# Patient Record
Sex: MALE | Race: ASIAN | URBAN - METROPOLITAN AREA
[De-identification: names, ages, dates, MRNs, and addresses within clinical notes are randomized per-mention and may not be internally consistent; named-entity substitution may affect disease eponyms.]

---

## 2023-02-02 ENCOUNTER — OFFICE VISIT (OUTPATIENT)
Dept: URBAN - METROPOLITAN AREA CLINIC 51 | Facility: CLINIC | Age: 63
End: 2023-02-02
Payer: COMMERCIAL

## 2023-02-02 DIAGNOSIS — H02.831 DERMATOCHALASIS OF RIGHT UPPER EYELID: ICD-10-CM

## 2023-02-02 DIAGNOSIS — H25.813 COMBINED FORMS OF AGE-RELATED CATARACT, BILATERAL: Primary | ICD-10-CM

## 2023-02-02 DIAGNOSIS — H02.834 DERMATOCHALASIS OF LEFT UPPER EYELID: ICD-10-CM

## 2023-02-02 DIAGNOSIS — H43.813 VITREOUS DEGENERATION, BILATERAL: ICD-10-CM

## 2023-02-02 PROCEDURE — 92134 CPTRZ OPH DX IMG PST SGM RTA: CPT | Performed by: OPTOMETRIST

## 2023-02-02 PROCEDURE — 99204 OFFICE O/P NEW MOD 45 MIN: CPT | Performed by: OPTOMETRIST

## 2023-02-02 ASSESSMENT — KERATOMETRY
OS: 43.38
OD: 44.38

## 2023-02-02 ASSESSMENT — VISUAL ACUITY
OS: 20/25
OD: 20/30

## 2023-02-02 ASSESSMENT — INTRAOCULAR PRESSURE
OS: 12
OD: 11

## 2023-02-02 NOTE — IMPRESSION/PLAN
Impression: Combined forms of age-related cataract, bilateral: H25.813. Cataract causing symptomatic impairment of visual function not correctable with a tolerable change in glasses or contact lenses resulting in the patient's inability to function satisfactorily while performing Activities of Daily Life including, but not limited to reading, viewing television, driving, or meeting vocational or recreational needs. Plan:  Discussed cataracts with patient. Discussed treatment options. Surgical treatment is recommended. Surgical risks and benefits discussed. Patient elects surgical treatment. Recommend surgery OU, OD first. Aim OD: plano. Aim OS: plano. 
Pt is interested in specialty IOLs OU

## 2023-04-03 ENCOUNTER — ADULT PHYSICAL (OUTPATIENT)
Dept: URBAN - METROPOLITAN AREA CLINIC 44 | Facility: CLINIC | Age: 63
End: 2023-04-03
Payer: COMMERCIAL

## 2023-04-03 DIAGNOSIS — H25.813 COMBINED FORMS OF AGE-RELATED CATARACT, BILATERAL: Primary | ICD-10-CM

## 2023-04-03 DIAGNOSIS — Z01.818 ENCOUNTER FOR OTHER PREPROCEDURAL EXAMINATION: Primary | ICD-10-CM

## 2023-04-03 PROCEDURE — 92025 CPTRIZED CORNEAL TOPOGRAPHY: CPT | Performed by: OPHTHALMOLOGY

## 2023-04-03 PROCEDURE — 99203 OFFICE O/P NEW LOW 30 MIN: CPT | Performed by: PHYSICIAN ASSISTANT

## 2023-04-03 ASSESSMENT — PACHYMETRY
OD: 24.70
OS: 24.47
OS: 3.57
OD: 3.55

## 2023-04-10 ENCOUNTER — OFFICE VISIT (OUTPATIENT)
Dept: URBAN - METROPOLITAN AREA CLINIC 44 | Facility: CLINIC | Age: 63
End: 2023-04-10
Payer: COMMERCIAL

## 2023-04-10 DIAGNOSIS — H25.813 COMBINED FORMS OF AGE-RELATED CATARACT, BILATERAL: Primary | ICD-10-CM

## 2023-04-10 DIAGNOSIS — H04.123 TEAR FILM INSUFFICIENCY OF BILATERAL LACRIMAL GLANDS: ICD-10-CM

## 2023-04-10 DIAGNOSIS — H52.223 REGULAR ASTIGMATISM, BILATERAL: ICD-10-CM

## 2023-04-10 DIAGNOSIS — H43.813 VITREOUS DEGENERATION, BILATERAL: ICD-10-CM

## 2023-04-10 PROCEDURE — 99204 OFFICE O/P NEW MOD 45 MIN: CPT | Performed by: OPHTHALMOLOGY

## 2023-04-10 ASSESSMENT — INTRAOCULAR PRESSURE
OD: 11
OS: 11

## 2023-04-10 NOTE — IMPRESSION/PLAN
Impression: Combined forms of age-related cataract, bilateral: H25.813. Plan: Discussed cataract diagnosis with the patient. Cataracts are limiting vision. Discussed risks, benefits and alternatives to surgery including but not limited to: bleeding, infection, risk of vision loss, loss of the eye, need for other surgery. Patient voiced understanding and wishes to proceed. Patient elects surgical treatment. Specialty lens options discussed and pt declines. Patient desires surgery OU,OD  first, Standard IOL  (( AIM :-0.25 OU, INJECTABLE OU (DEXTENZA FIRST CHOICE ,TRIMOXI SECOND CHOICE), YES ORA, YES  AMP )) Patient understands the need for glasses after surgery for BCVA.

## 2023-04-21 ENCOUNTER — POST-OPERATIVE VISIT (OUTPATIENT)
Dept: URBAN - METROPOLITAN AREA CLINIC 43 | Facility: CLINIC | Age: 63
End: 2023-04-21

## 2023-04-21 DIAGNOSIS — Z48.810 ENCOUNTER FOR SURGICAL AFTERCARE FOLLOWING SURGERY ON A SENSE ORGAN: Primary | ICD-10-CM

## 2023-04-21 PROCEDURE — 99024 POSTOP FOLLOW-UP VISIT: CPT | Performed by: OPTOMETRIST

## 2023-04-21 ASSESSMENT — INTRAOCULAR PRESSURE: OD: 17

## 2023-04-21 NOTE — IMPRESSION/PLAN
Impression: S/P Cataract Extraction by phacoemulsification with IOL placement; ORA OD - 1 Day. Encounter for surgical aftercare following surgery on a sense organ  Z48.810.  Plan: Pt doing well, return as scheduled for next post op

## 2023-04-27 ENCOUNTER — POST-OPERATIVE VISIT (OUTPATIENT)
Dept: URBAN - METROPOLITAN AREA CLINIC 51 | Facility: CLINIC | Age: 63
End: 2023-04-27
Payer: COMMERCIAL

## 2023-04-27 DIAGNOSIS — Z48.810 ENCOUNTER FOR SURGICAL AFTERCARE FOLLOWING SURGERY ON A SENSE ORGAN: Primary | ICD-10-CM

## 2023-04-27 PROCEDURE — 99024 POSTOP FOLLOW-UP VISIT: CPT | Performed by: OPTOMETRIST

## 2023-04-27 ASSESSMENT — VISUAL ACUITY
OD: 20/20
OS: 20/25

## 2023-04-27 ASSESSMENT — INTRAOCULAR PRESSURE
OD: 16
OS: 17

## 2023-04-27 NOTE — IMPRESSION/PLAN
Impression: S/P Cataract Extraction by phacoemulsification with IOL placement; ORA OD - 7 Days. Encounter for surgical aftercare following surgery on a sense organ  Z48.810. Plan: Pt is healing well with first eye cataract surgery and is ready to proceed with cataract surgery. Pt to continue with artificial tears to minimize irritation and fluctuating vision that can occur after cataract surgery.

## 2023-05-05 ENCOUNTER — POST-OPERATIVE VISIT (OUTPATIENT)
Dept: URBAN - METROPOLITAN AREA CLINIC 51 | Facility: CLINIC | Age: 63
End: 2023-05-05
Payer: COMMERCIAL

## 2023-05-05 DIAGNOSIS — Z96.1 PRESENCE OF INTRAOCULAR LENS: Primary | ICD-10-CM

## 2023-05-05 PROCEDURE — 99024 POSTOP FOLLOW-UP VISIT: CPT | Performed by: OPTOMETRIST

## 2023-05-05 ASSESSMENT — INTRAOCULAR PRESSURE: OS: 19

## 2023-05-05 NOTE — IMPRESSION/PLAN
Impression: S/P Cataract Extraction by phacoemulsification with IOL placement; ORA OS - 1 Day. Presence of intraocular lens  Z96.1. Plan: Doing well POD1. Swelling and inflammation noted on today's examination. Discussed with patient that vision will continue to improve as swelling and inflammation resolves. Subconjunctival hemorrhage will resolve, however, can take upwards of a few weeks. Recommend AT's when eyes feel dry/gritty, itchy, or water. Restrictions apply for 1 week. Can trial OTC reading glasses. 
RTC as scheduled for final post op appointment/SRx

## 2023-05-31 ENCOUNTER — POST-OPERATIVE VISIT (OUTPATIENT)
Dept: URBAN - METROPOLITAN AREA CLINIC 51 | Facility: CLINIC | Age: 63
End: 2023-05-31
Payer: COMMERCIAL

## 2023-05-31 DIAGNOSIS — H52.4 PRESBYOPIA: ICD-10-CM

## 2023-05-31 DIAGNOSIS — Z96.1 PRESENCE OF INTRAOCULAR LENS: Primary | ICD-10-CM

## 2023-05-31 PROCEDURE — 99024 POSTOP FOLLOW-UP VISIT: CPT | Performed by: OPTOMETRIST

## 2023-05-31 ASSESSMENT — VISUAL ACUITY
OD: 20/20
OS: 20/30

## 2023-05-31 ASSESSMENT — INTRAOCULAR PRESSURE
OS: 17
OD: 15

## 2023-05-31 NOTE — IMPRESSION/PLAN
Impression: S/P Cataract Extraction by phacoemulsification with IOL placement; ORA OS - 27 Days. Presence of intraocular lens  Z96.1. Plan: Reviewed fully healed from surgery and that there are no restrictions. Discussed options on updating glasses. Patient prefers to update his glasses prescription Pt to continue with artificial tears at least four times a day for comfort. Recommend annual eye exams. Reviewed if there is a 's license restriction with corrective lenses needed, patient is responsible to get that cleared through [employer]. Pt understands.